# Patient Record
(demographics unavailable — no encounter records)

---

## 2025-03-24 NOTE — HISTORY OF PRESENT ILLNESS
[FreeTextEntry1] : MARY MONCADA is a 70-year-old female, with a PMHx significant for HTN, HLD, and s/p right L1-2 microdiscectomy and L4-5 laminoforaminotomy, who presents today for new patient evaluation. Patient reports she has lost 30 lbs since her surgery. She complains of symptomatic dizziness when going from a sitting position to standing position. States she feels her heart racing when she goes up the stairs. Patient admits to not drinking water during the day and mentions her urine is a darker color than usual.   Orthostatic BPs: Standin/50 mmHg Sittin/60 mmHg

## 2025-03-24 NOTE — CARDIOLOGY SUMMARY
[de-identified] : 03/24/2025: NSR, (-) ST-T wave changes. =======================================================

## 2025-03-24 NOTE — DISCUSSION/SUMMARY
[EKG obtained to assist in diagnosis and management of assessed problem(s)] : EKG obtained to assist in diagnosis and management of assessed problem(s) [FreeTextEntry1] : EKG performed today was unremarkable.  Dizziness: Patient advised to increase hydration to 4-6 bottles of water per day and decrease caffeine intake.   HTN: Discontinue Amlodipine. Reduce Benazepril to 20mg QD. Continue Metoprolol 25mg QD. Other planned treatments include an exercise regimen, weight loss, low sodium diet, and alcohol moderation.  HLD: Currently, the condition is stable. There are no changes in medication management. Continue current medical therapy. Other planned treatment includes diet modification, exercise, and weight loss.  Instructed to follow up in 1 week to reassess BPs.  Plan was discussed with the patient.